# Patient Record
Sex: FEMALE | Race: WHITE | NOT HISPANIC OR LATINO | Employment: FULL TIME | URBAN - METROPOLITAN AREA
[De-identification: names, ages, dates, MRNs, and addresses within clinical notes are randomized per-mention and may not be internally consistent; named-entity substitution may affect disease eponyms.]

---

## 2017-07-11 ENCOUNTER — TELEPHONE (OUTPATIENT)
Dept: URGENT CARE | Facility: CLINIC | Age: 42
End: 2017-07-11

## 2024-03-01 ENCOUNTER — PATIENT ROUNDING (BHMG ONLY) (OUTPATIENT)
Dept: URGENT CARE | Facility: CLINIC | Age: 49
End: 2024-03-01
Payer: COMMERCIAL

## 2024-03-01 NOTE — ED NOTES
Thank you for letting us care for you at your recent visit to [facility]. We would love to hear about your experience with us. Was this the first time you have visited our location?    We’re always looking for ways to make our patients’ experience even better. Do you have any recommendations on ways we may improve?     Please be on the lookout for a survey about your recent visit from Effie Haywood via text or email. We would greatly appreciate if you could fill that out and turn it back in. We want your voice to be heard and we value your [email or web address]     Thank you for choosing [facility] for your healthcare needs. I appreciate you taking the time to respond!